# Patient Record
Sex: FEMALE | URBAN - METROPOLITAN AREA
[De-identification: names, ages, dates, MRNs, and addresses within clinical notes are randomized per-mention and may not be internally consistent; named-entity substitution may affect disease eponyms.]

---

## 2023-10-03 ENCOUNTER — TELEPHONE (OUTPATIENT)
Dept: PRIMARY CARE CLINIC | Age: 73
End: 2023-10-03

## 2023-10-03 NOTE — TELEPHONE ENCOUNTER
[facility] called trying to get \"verbal orders\" for In home nursing with PT/OT for this patient- stating she was a patient of yours. I confirmed to them that she has never been seen in our office or treated by you at any facility. I explained that we cannot give any verbal orders on someone we don't know.   They agreed and stated that they will investigate further into her PCP

## 2023-10-04 NOTE — TELEPHONE ENCOUNTER
\"Blank\" [facility]#[id number] / [facility] [address] faxed from facility. PCP: Originals Scanned to [employer] . and placed in [place] Folder for further processing. Please route message back to department pool and return original paperwork to [place], MA place paperwork in Reg folder for fax/scanning.

## 2023-10-06 RX ORDER — PRAVASTATIN SODIUM 20 MG
20 TABLET ORAL DAILY
COMMUNITY
Start: 2023-08-27

## 2023-10-06 RX ORDER — FLUTICASONE PROPIONATE AND SALMETEROL 50; 250 UG/1; UG/1
POWDER RESPIRATORY (INHALATION)
COMMUNITY
Start: 2023-07-06

## 2023-10-06 RX ORDER — FLUOXETINE HYDROCHLORIDE 20 MG/1
20 CAPSULE ORAL EVERY MORNING
COMMUNITY
Start: 2023-08-31

## 2023-10-06 RX ORDER — LISINOPRIL 20 MG/1
20 TABLET ORAL DAILY
COMMUNITY
Start: 2023-08-14

## 2023-10-16 ENCOUNTER — TELEPHONE (OUTPATIENT)
Dept: PRIMARY CARE CLINIC | Age: 73
End: 2023-10-16

## 2023-10-16 NOTE — TELEPHONE ENCOUNTER
----- Message from Almas Cadena sent at 10/16/2023 12:31 PM EDT -----  Subject: Message to Provider    QUESTIONS  Information for Provider? Lul Ponce with [facility], she was admitted to   [facility] on 9/16/23, transferred to [facility] and rehab on   09/27/2023 with compression fracture in thoracic region. D/c from [facility] on 10/06/2023 to home. She is getting PT, OT and SSN at home   currently. Call Nohemi Srinivasan with any questions with [facility]  ---------------------------------------------------------------------------  --------------  Maggie Quezada Aivo  [phone]; OK to leave message on voicemail  ---------------------------------------------------------------------------  --------------  SCRIPT ANSWERS  Relationship to Patient? Covered Entity  Covered Entity Type? Health Insurance? Representative Name?  Rosalinda Rasheed

## 2023-10-16 NOTE — TELEPHONE ENCOUNTER
Patient has new patient appointment on 10.26.2023. No [facility] orders can be signed until patient establishes care. No further action is needed for this encounter.